# Patient Record
Sex: MALE | Race: WHITE | Employment: UNEMPLOYED | URBAN - METROPOLITAN AREA
[De-identification: names, ages, dates, MRNs, and addresses within clinical notes are randomized per-mention and may not be internally consistent; named-entity substitution may affect disease eponyms.]

---

## 2020-08-07 ENCOUNTER — HOSPITAL ENCOUNTER (OUTPATIENT)
Dept: ULTRASOUND IMAGING | Age: 45
Discharge: HOME | End: 2020-08-07
Attending: INTERNAL MEDICINE
Payer: COMMERCIAL

## 2020-08-07 DIAGNOSIS — K76.0 NON-ALCOHOLIC FATTY LIVER DISEASE: ICD-10-CM

## 2020-08-07 DIAGNOSIS — K29.70 GASTRITIS, UNSPECIFIED, WITHOUT BLEEDING: ICD-10-CM

## 2020-08-07 DIAGNOSIS — R10.13 ABDOMINAL PAIN, EPIGASTRIC: ICD-10-CM

## 2020-08-07 PROCEDURE — 76981 USE PARENCHYMA: CPT

## 2022-08-18 ENCOUNTER — APPOINTMENT (OUTPATIENT)
Dept: NUTRITION | Age: 47
End: 2022-08-18

## 2022-09-26 ENCOUNTER — HOSPITAL ENCOUNTER (OUTPATIENT)
Dept: NUTRITION | Age: 47
Discharge: HOME | End: 2022-09-26
Payer: COMMERCIAL

## 2022-09-26 PROCEDURE — 97802 MEDICAL NUTRITION INDIV IN: CPT

## 2022-09-28 NOTE — PROGRESS NOTES
[address] [place] [address]     Nutrition Assessment - Medical Nutrition Therapy   Outpatient Initial Evaluation         Patient Name: Pricilla Webster : 1975   Treatment Diagnosis: Overweight  Weight Gain  GRANADOS   Referral Source: Bernadette Gonzalez MD Start of Care [facility]): 2022     Gender: male Age: 41 y.o. Ht: 5' 8\" in Wt: 199  lb  kg   BMI: 27 BMR   Male Taw[place]ie 95 BMR Female      Past Medical History:  Asthma  Overweight       Pertinent Medications: Wellbutrin XL  Zyrtec  Centrum Men's  Protonix     Biochemical Data:   No results found for: HBA1C, EUF2QDYM, [id number]  No results found for: NA, K, CL, CO2, AGAP, GLU, BUN, CREA, BUCR, GFRAA, GFRNA, CA, TBIL, TBILI, AP, TP, ALB, GLOB, AGRAT, ALT, AST  No results found for: CHOL, CHOLPOCT, CHOLX, CHLST, CHOLV, TOTCHOLEXT, HDL, HDLPOC, HDLEXT, HDLP, LDL, LDLCPOC, LDLCEXT, LDLC, DLDLP, VLDLC, VLDL, TGLX, TRIGL, TRIGLYCEXT, TRIGP, TGLPOCT, CHHD, CHHDX  No results found for: ALT, AST, GGT, GGTP, AP, APIT, APX, CBIL, TBIL, TBILI  No results found for: MIESHA, CREAPOC, ACREA, CREA, REFC3, REFC4  No results found for: BUN, BUNPOC, IBUN, MBUNV, BUNV  No results found for: MCACR, MCA1, MCA2, MCA3, MCAU, MCAU2, MCALPOCT     Assessment:   Patient is a 41year old male with a BMI of 30  who visits RD for insight on how to lose weight. He shares that he was on Weight [employer] and this worked for him. They have removed the ability for him to have his \"non-negotiable cane sugar soda\" daily and he needs to find another plan. Patient's sleeping habits need improvement. Food & Nutrition: Patient eats 3 meals a day and snacks. Breakfast may consist of Boost Max Shake or leftovers. A lunch choice may be a frozen dinner or chicken sausage. Dinner choices consist of a take out food or sushi now and then. Snacks may be cookies with his soda. Patient consumes liquid calories consisting of cane sugar sodas.          Estimate Needs   Calories: 1800  Protein: 110 Carbs: <180 Fat: 80   Kcal/day  g/day  g/day  g/day                            Nutrition Diagnosis Overweight/obesity related to excessive energy intake as evidenced by a BMI of 30. Undesirable food choices as evidenced by patients food recall; patient enjoys soda's and desserts daily. Nutrition Intervention &  Education: Patient encouraged to drink >64 ounces of water daily. Patient encouraged to limit soda to 1 per day and have it earlier in the day such as lunch. Patient encouraged to keep his carbohydrates at the limits suggested below. Educated pt on all carbohydrates found in foods and encouraged no more than 40-60 gm/meal; 60 for breakfast and lunch and 40 for dinner. Less than or equal to 20 gm/snack. Patient was educated on the importance of making lifestyle changes such as:  Eating off a smaller plate and drinking from smaller glasses. Increasing activity. Menu planning and tracking. Recommend My Fitness Pal for tracking. The importance of eating breakfast.  Appropriate snacks. Portion control. The importance of good sleeping habits. How to read a label. Patient was encouraged to do this before purchasing and consuming an item. Handouts Provided: [x]  Carbohydrates  [x]  Protein  [x]  Non-starchy Vegatbles  [x]  Food Label  [x]  Meal and Snack Ideas  [x]  Food Journals []  Diabetes  []  Cholesterol  []  Sodium  [x]  Gen Nutr Guidelines  []  SBGM Guidelines  []  Others:   Information Reviewed with: Patient    Readiness to Change Stage:   []  Pre-contemplative    []  Contemplative  []  Preparation               [x]  Action                  []  Maintenance   Potential Barriers to Learning: []  Decline in memory    []  Language barrier   []  Other:  []  Emotional                  []  Limited mobility  []  Lack of motivation     [] Vision, hearing or cognitive impairment   Expected Compliance: Good.      Nutritional Goal - To promote lifestyle changes to result in:    [x]  Weight loss  []  Improved diabetic control  []  Decreased cholesterol levels  []  Decreased blood pressure  []  Weight maintenance []  Preventing any interactions associated with food allergies  []  Adequate weight gain toward goal weight  [x]  Other:   Improve liver health. Patient Goals:    > 64 ounces of water daily. Monitor carbohydrate intake. Improve sleep habits. Dietitian Signature:  Jacques Flowers RD Date: 09/26/2022   Follow-up: Phkeohjdu33/31/2022  12:00 pm

## 2022-10-31 ENCOUNTER — HOSPITAL ENCOUNTER (OUTPATIENT)
Dept: NUTRITION | Age: 47
Discharge: HOME | End: 2022-10-31
Payer: COMMERCIAL

## 2022-10-31 PROCEDURE — 97803 MED NUTRITION INDIV SUBSEQ: CPT

## 2022-11-02 NOTE — PROGRESS NOTES
NUTRITION - FOLLOW-UP TREATMENT NOTE  Patient Name: Alana Perez         Date: 10/31/2022  : 1975    YES/NO Patient  Verified  Diagnosis: Weight Gain      Total Treatment Time (min):   30     SUBJECTIVE/ASSESSMENT:  Patient has struggled with an episode of pancreatitis over the last month. This has made it difficult to focus on a balanced diet. Changes in medication or medical history? Any new allergies, surgeries or procedures? YES/NO    If yes, update Summary List   Patient has experienced an episode of pancreatitis and need to avoid fat but indulged in sugary foods such as soda. Current Wt: 199# Previous Wt: 199# Wt Change: 0#     Achievement of Goals:  Patient has maintained weight. Patient Education:  [x]  Review current plan with patient   []  Other:    Handouts/  Information Provided: []  Carbohydrates  []  Protein  []  Fiber  []  Serving Sizes  []  Fluids  []  General guidelines []  Diabetes  []  Cholesterol  []  Sodium  []  SBGM  []  Food Journals  []  Others:      New Patient Goals:  Walk when a trigger has set off negative emotions.      PLAN    [x]  Continue on current plan []  Follow-up PRN   []  Discharge due to :    [x]  Next appt: Not scheduled     Dietitian: Mannie Venegas RD    Date: 10/31/2022